# Patient Record
Sex: FEMALE | Race: WHITE | NOT HISPANIC OR LATINO | Employment: FULL TIME | URBAN - METROPOLITAN AREA
[De-identification: names, ages, dates, MRNs, and addresses within clinical notes are randomized per-mention and may not be internally consistent; named-entity substitution may affect disease eponyms.]

---

## 2019-01-15 ENCOUNTER — HOSPITAL ENCOUNTER (EMERGENCY)
Facility: HOSPITAL | Age: 34
Discharge: HOME | End: 2019-01-16
Attending: EMERGENCY MEDICINE
Payer: MEDICAID

## 2019-01-15 ENCOUNTER — NURSE TRIAGE (OUTPATIENT)
Dept: ADMINISTRATIVE | Facility: CLINIC | Age: 34
End: 2019-01-15

## 2019-01-15 DIAGNOSIS — R07.9 CHEST PAIN: ICD-10-CM

## 2019-01-15 LAB
ALBUMIN SERPL BCP-MCNC: 4 G/DL
ALP SERPL-CCNC: 113 U/L
ALT SERPL W/O P-5'-P-CCNC: 39 U/L
ANION GAP SERPL CALC-SCNC: 12 MMOL/L
AST SERPL-CCNC: 39 U/L
B-HCG UR QL: NEGATIVE
BASOPHILS # BLD AUTO: 0.01 K/UL
BASOPHILS NFR BLD: 0.1 %
BILIRUB SERPL-MCNC: 0.6 MG/DL
BUN SERPL-MCNC: 7 MG/DL
CALCIUM SERPL-MCNC: 9.4 MG/DL
CHLORIDE SERPL-SCNC: 104 MMOL/L
CO2 SERPL-SCNC: 23 MMOL/L
CREAT SERPL-MCNC: 0.7 MG/DL
DIFFERENTIAL METHOD: ABNORMAL
EOSINOPHIL # BLD AUTO: 0.2 K/UL
EOSINOPHIL NFR BLD: 2 %
ERYTHROCYTE [DISTWIDTH] IN BLOOD BY AUTOMATED COUNT: 13.3 %
EST. GFR  (AFRICAN AMERICAN): >60 ML/MIN/1.73 M^2
EST. GFR  (NON AFRICAN AMERICAN): >60 ML/MIN/1.73 M^2
GLUCOSE SERPL-MCNC: 94 MG/DL
HCT VFR BLD AUTO: 43 %
HGB BLD-MCNC: 14.6 G/DL
LYMPHOCYTES # BLD AUTO: 3 K/UL
LYMPHOCYTES NFR BLD: 32.1 %
MCH RBC QN AUTO: 31.3 PG
MCHC RBC AUTO-ENTMCNC: 34 G/DL
MCV RBC AUTO: 92 FL
MONOCYTES # BLD AUTO: 0.6 K/UL
MONOCYTES NFR BLD: 6 %
NEUTROPHILS # BLD AUTO: 5.6 K/UL
NEUTROPHILS NFR BLD: 59.8 %
PLATELET # BLD AUTO: 238 K/UL
PMV BLD AUTO: 10.7 FL
POTASSIUM SERPL-SCNC: 3.8 MMOL/L
PROT SERPL-MCNC: 7 G/DL
RBC # BLD AUTO: 4.67 M/UL
SODIUM SERPL-SCNC: 139 MMOL/L
TROPONIN I SERPL DL<=0.01 NG/ML-MCNC: 0.01 NG/ML
WBC # BLD AUTO: 9.29 K/UL

## 2019-01-15 PROCEDURE — 80053 COMPREHEN METABOLIC PANEL: CPT

## 2019-01-15 PROCEDURE — 85025 COMPLETE CBC W/AUTO DIFF WBC: CPT

## 2019-01-15 PROCEDURE — 93010 EKG 12-LEAD: ICD-10-PCS | Mod: ,,, | Performed by: INTERNAL MEDICINE

## 2019-01-15 PROCEDURE — 81025 URINE PREGNANCY TEST: CPT

## 2019-01-15 PROCEDURE — 25000003 PHARM REV CODE 250: Performed by: EMERGENCY MEDICINE

## 2019-01-15 PROCEDURE — 84484 ASSAY OF TROPONIN QUANT: CPT

## 2019-01-15 PROCEDURE — 93010 ELECTROCARDIOGRAM REPORT: CPT | Mod: ,,, | Performed by: INTERNAL MEDICINE

## 2019-01-15 PROCEDURE — 36415 COLL VENOUS BLD VENIPUNCTURE: CPT

## 2019-01-15 PROCEDURE — 99285 EMERGENCY DEPT VISIT HI MDM: CPT | Mod: 25

## 2019-01-15 RX ORDER — IBUPROFEN 400 MG/1
400 TABLET ORAL
Status: COMPLETED | OUTPATIENT
Start: 2019-01-15 | End: 2019-01-15

## 2019-01-15 RX ORDER — PROMETHAZINE HYDROCHLORIDE 25 MG/1
25 TABLET ORAL
Status: COMPLETED | OUTPATIENT
Start: 2019-01-15 | End: 2019-01-15

## 2019-01-15 RX ADMIN — IBUPROFEN 400 MG: 400 TABLET, FILM COATED ORAL at 10:01

## 2019-01-15 RX ADMIN — PROMETHAZINE HYDROCHLORIDE 25 MG: 25 TABLET ORAL at 10:01

## 2019-01-16 VITALS
SYSTOLIC BLOOD PRESSURE: 124 MMHG | OXYGEN SATURATION: 100 % | HEIGHT: 65 IN | DIASTOLIC BLOOD PRESSURE: 68 MMHG | BODY MASS INDEX: 28.62 KG/M2 | HEART RATE: 70 BPM | RESPIRATION RATE: 17 BRPM | TEMPERATURE: 99 F

## 2019-01-16 NOTE — ED PROVIDER NOTES
SCRIBE #1 NOTE: I, Ruba Naranjo, am scribing for, and in the presence of, Cristofer Alexander MD. I have scribed the entire note.         History     Chief Complaint   Patient presents with    Chest Pain     324 mg asa 1 spray nitro       Review of patient's allergies indicates:   Allergen Reactions    Dioxyline phosphate Nausea And Vomiting    Decadron [dexamethasone sodium phosphate]     Doxycycline     Lortab [hydrocodone-acetaminophen]     Morphine      Migraine      Percocet [oxycodone-acetaminophen]     Reglan [metoclopramide] Other (See Comments)     Pt reports seizure activity after taking reglan    Zofran [ondansetron hcl (pf)] Hives         History of Present Illness   HPI    1/15/2019, 9:35 PM  History obtained from the patient      History of Present Illness: Jimena Escalante is a 33 y.o. female patient with PMHx of GERD, anxiety, fibromyalgia, and tachycardia who presents to the Emergency Department for midsternal CP which onset gradually earlier today. Patient states sxs onset 30 minutes after taking Topamax and cough medication. Patient states she has had a cough and congestion for the last 3 weeks; rhinorrhea has resolved. Symptoms are constant and moderate in severity. No mitigating or exacerbating factors reported. Associated sxs include SOB which is improving since onset. Patient received 325mg aspirin and nitro spray en route with minimal relief. Patient denies any fever, chills, diaphoresis, palpitations, extremity weakness/numbness, leg swelling, calf pain, dizziness, N/V, and all other sxs at this time. Patient reports having heart cath at [facility] 3-4 years ago for similar sxs. Patient admits to daily cigarette use. No further complaints or concerns at this time.     Arrival mode:  AASI    PCP: Oh Pat MD        Past Medical History:  Past Medical History:   Diagnosis Date    ADD (attention deficit disorder)     Anxiety     Degenerative disc disease     Depression     DJD  (degenerative joint disease)     Fibromyalgia     Gastroparesis     GERD (gastroesophageal reflux disease)     H/O Clostridium difficile infection     IBS (irritable bowel syndrome)     Meningitis     Migraine headache     Osteoarthritis     Ovarian cyst     Tachycardia     West Nile fever meningitis        Past Surgical History:  Past Surgical History:   Procedure Laterality Date    CARDIAC CATHETERIZATION      EXTRACORPOREAL SHOCK WAVE LITHOTRIPSY      HYSTERECTOMY      KNEE ARTHROSCOPY      TUBAL LIGATION           Family History:  Family History   Problem Relation Age of Onset    Hypertension Mother     Hypertension Father     Heart disease Father     Diabetes Maternal Aunt     Diabetes Maternal Grandmother        Social History:  Social History     Tobacco Use    Smoking status: Current Every Day Smoker     Packs/day: 0.50     Types: Cigarettes    Smokeless tobacco: Never Used   Substance and Sexual Activity    Alcohol use: Yes     Comment: occasionally    Drug use: No    Sexual activity: Yes     Partners: Male        Review of Systems   Review of Systems   Constitutional: Negative for chills, diaphoresis and fever.   HENT: Positive for congestion and rhinorrhea (resolved). Negative for sore throat.    Respiratory: Positive for cough and shortness of breath.    Cardiovascular: Positive for chest pain. Negative for palpitations and leg swelling.   Gastrointestinal: Negative for nausea and vomiting.   Genitourinary: Negative for dysuria.   Musculoskeletal: Negative for back pain.        (-) calf pain   Skin: Negative for rash.   Neurological: Negative for dizziness, weakness and numbness.   Hematological: Does not bruise/bleed easily.   All other systems reviewed and are negative.       Physical Exam     Initial Vitals [01/15/19 2108]   BP Pulse Resp Temp SpO2   135/89 80 16 98.1 °F (36.7 °C) 98 %      MAP       --          Physical Exam  Nursing Notes and Vital Signs  "Reviewed.  Constitutional: Patient is in no acute distress. Well-developed and well-nourished.  Head: Atraumatic. Normocephalic.  Eyes: PERRL. EOM intact. Conjunctivae are not pale. No scleral icterus.  ENT: Mucous membranes are moist. Oropharynx is clear and symmetric.    Neck: Supple. Full ROM. No lymphadenopathy.  Cardiovascular: Regular rate. Regular rhythm. No murmurs, rubs, or gallops. Distal pulses are 2+ and symmetric.  Pulmonary/Chest: No respiratory distress. Clear to auscultation bilaterally. No wheezing or rales.  Abdominal: Soft and non-distended.  There is no tenderness.  No rebound, guarding, or rigidity.   Musculoskeletal: Moves all extremities. No obvious deformities. No edema. No calf tenderness.  Skin: Warm and dry.  Neurological:  Alert, awake, and appropriate.  Normal speech.  No acute focal neurological deficits are appreciated.  Psychiatric: Normal affect. Good eye contact. Appropriate in content.     ED Course   Procedures  ED Vital Signs:  Vitals:    01/15/19 2108   BP: 135/89   Pulse: 80   Resp: 16   Temp: 98.1 °F (36.7 °C)   TempSrc: Oral   SpO2: 98%   Height: 5' 5" (1.651 m)       Abnormal Lab Results:  Labs Reviewed   CBC W/ AUTO DIFFERENTIAL - Abnormal; Notable for the following components:       Result Value    MCH 31.3 (*)     All other components within normal limits   COMPREHENSIVE METABOLIC PANEL   TROPONIN I   PREGNANCY TEST, URINE RAPID        All Lab Results:  Results for orders placed or performed during the hospital encounter of 01/15/19   CBC auto differential   Result Value Ref Range    WBC 9.29 3.90 - 12.70 K/uL    RBC 4.67 4.00 - 5.40 M/uL    Hemoglobin 14.6 12.0 - 16.0 g/dL    Hematocrit 43.0 37.0 - 48.5 %    MCV 92 82 - 98 fL    MCH 31.3 (H) 27.0 - 31.0 pg    MCHC 34.0 32.0 - 36.0 g/dL    RDW 13.3 11.5 - 14.5 %    Platelets 238 150 - 350 K/uL    MPV 10.7 9.2 - 12.9 fL    Gran # (ANC) 5.6 1.8 - 7.7 K/uL    Lymph # 3.0 1.0 - 4.8 K/uL    Mono # 0.6 0.3 - 1.0 K/uL    Eos # " 0.2 0.0 - 0.5 K/uL    Baso # 0.01 0.00 - 0.20 K/uL    Gran% 59.8 38.0 - 73.0 %    Lymph% 32.1 18.0 - 48.0 %    Mono% 6.0 4.0 - 15.0 %    Eosinophil% 2.0 0.0 - 8.0 %    Basophil% 0.1 0.0 - 1.9 %    Differential Method Automated    Comprehensive metabolic panel   Result Value Ref Range    Sodium 139 136 - 145 mmol/L    Potassium 3.8 3.5 - 5.1 mmol/L    Chloride 104 95 - 110 mmol/L    CO2 23 23 - 29 mmol/L    Glucose 94 70 - 110 mg/dL    BUN, Bld 7 6 - 20 mg/dL    Creatinine 0.7 0.5 - 1.4 mg/dL    Calcium 9.4 8.7 - 10.5 mg/dL    Total Protein 7.0 6.0 - 8.4 g/dL    Albumin 4.0 3.5 - 5.2 g/dL    Total Bilirubin 0.6 0.1 - 1.0 mg/dL    Alkaline Phosphatase 113 55 - 135 U/L    AST 39 10 - 40 U/L    ALT 39 10 - 44 U/L    Anion Gap 12 8 - 16 mmol/L    eGFR if African American >60 >60 mL/min/1.73 m^2    eGFR if non African American >60 >60 mL/min/1.73 m^2   Troponin I   Result Value Ref Range    Troponin I 0.008 0.000 - 0.026 ng/mL   Pregnancy, urine rapid (UPT)   Result Value Ref Range    Preg Test, Ur Negative        Imaging Results:  Imaging Results          X-Ray Chest PA And Lateral (Final result)  Result time 01/15/19 22:26:07    Final result by Jason Alfonso MD (01/15/19 22:26:07)                 Impression:      No acute findings.      Electronically signed by: Jason Alfonso MD  Date:    01/15/2019  Time:    22:26             Narrative:    EXAMINATION:  XR CHEST PA AND LATERAL    CLINICAL HISTORY:  Chest pain, unspecified    TECHNIQUE:  PA and lateral views of the chest were performed.    COMPARISON:  11/18/2016    FINDINGS:  The cardiac and mediastinal silhouettes appear within normal limits.   The lungs are clear bilaterally.  No acute osseous findings demonstrated.                                 The EKG was ordered, reviewed, and independently interpreted by the ED provider.  Interpretation time: 2135  Rate: 82 BPM  Rhythm: normal sinus rhythm  Interpretation: Normal axis. Normal intervals. No significant ST  "depression or ST elevation. No STEMI.            The Emergency Provider reviewed the vital signs and test results, which are outlined above.     ED Discussion     12:05 AM: Reassessed pt at this time.  Pt states her condition has improved at this time. Patient states "I feel good, just get me home". Discussed with pt all pertinent ED information and results. Discussed pt dx and plan of tx. Gave pt all f/u and return to the ED instructions. All questions and concerns were addressed at this time. Pt expresses understanding of information and instructions, and is comfortable with plan to discharge. Pt is stable for discharge.    I have discussed with patient and/or family/caretaker chest pain precautions, specifically to return for worsening chest pain, shortness of breath, fever, or any concern.  I have low suspicion for cardiopulmonary, vascular, infectious, respiratory, or other emergent medical condition based on my evaluation in the ED.    I discussed with patient and/or family/caretaker that evaluation in the ED does not suggest any emergent or life threatening medical conditions requiring immediate intervention beyond what was provided in the ED, and I believe patient is safe for discharge.  Regardless, an unremarkable evaluation in the ED does not preclude the development or presence of a serious of life threatening condition. As such, patient was instructed to return immediately for any worsening or change in current symptoms.          ED Medication(s):  Medications   promethazine tablet 25 mg (25 mg Oral Given 1/15/19 2236)   ibuprofen tablet 400 mg (400 mg Oral Given 1/15/19 2236)     Current Discharge Medication List   None       Follow-up Information     Schedule an appointment as soon as possible for a visit  with Oh Pat MD.    Specialty:  Family Medicine  Contact information:  [address]   [address]  [facility] [address] [phone]             [facility] - .    Specialty:  " Emergency Medicine  Why:  As needed, If symptoms worsen  Contact information:  [address]  [place] [facility] [address] [phone]                      Medical Decision Making     Medical Decision Making:   Clinical Tests:   Lab Tests: Ordered and Reviewed  Radiological Study: Ordered and Reviewed  Medical Tests: Ordered and Reviewed  ED Management:  Reviewed patient's cardiac cath at [facility] from December 2015 that was normal.     Additional MDM:   Heart Score:    History:          Slightly suspicious.  ECG:             Normal  Age:               Less than 45 years  Risk factors: 1-2 risk factors  Troponin:       Less than or equal to normal limit  Final Score: 1             Scribe Attestation:   Scribe #1: I performed the above scribed service and the documentation accurately describes the services I performed. I attest to the accuracy of the note.     Attending:   Physician Attestation Statement for Scribe #1: I, Cristofer Alexander MD, personally performed the services described in this documentation, as scribed by Ruba aNranjo, in my presence, and it is both accurate and complete.           Clinical Impression       ICD-10-CM ICD-9-CM   1. Chest pain R07.9 786.50       Disposition:   Disposition: Discharged  Condition: Stable         Cristofer Alexander MD  01/20/19 7806

## 2019-01-16 NOTE — TELEPHONE ENCOUNTER
Reason for Disposition   [1] Chest pain lasts > 5 minutes AND [2] age > 30 AND [3] at least one cardiac risk factor (i.e., hypertension, diabetes, obesity, smoker or strong family history of heart disease)    Protocols used: ST CHEST PAIN-A-AH

## 2019-01-16 NOTE — ED NOTES
Pt resting in ER stretcher, aaox4, rr e/u, NAD noted. Bed low and locked, call light in reach, side rails up x2. Pt provided with specimen cup for urine sample.  Pt verbalized understanding of status and POC; denies further needs. Will continue to monitor.

## 2019-03-22 ENCOUNTER — OFFICE VISIT (OUTPATIENT)
Dept: NEUROLOGY | Facility: CLINIC | Age: 34
End: 2019-03-22
Payer: MEDICAID

## 2019-03-22 VITALS
BODY MASS INDEX: 27.85 KG/M2 | SYSTOLIC BLOOD PRESSURE: 134 MMHG | HEIGHT: 65 IN | HEART RATE: 94 BPM | DIASTOLIC BLOOD PRESSURE: 79 MMHG | WEIGHT: 167.13 LBS

## 2019-03-22 DIAGNOSIS — G43.109 MIGRAINE WITH AURA AND WITHOUT STATUS MIGRAINOSUS, NOT INTRACTABLE: Primary | ICD-10-CM

## 2019-03-22 PROCEDURE — 99204 OFFICE O/P NEW MOD 45 MIN: CPT | Mod: S$PBB,,, | Performed by: PSYCHIATRY & NEUROLOGY

## 2019-03-22 PROCEDURE — 99213 OFFICE O/P EST LOW 20 MIN: CPT | Mod: PBBFAC | Performed by: PSYCHIATRY & NEUROLOGY

## 2019-03-22 PROCEDURE — 99999 PR PBB SHADOW E&M-EST. PATIENT-LVL III: CPT | Mod: PBBFAC,,, | Performed by: PSYCHIATRY & NEUROLOGY

## 2019-03-22 PROCEDURE — 99999 PR PBB SHADOW E&M-EST. PATIENT-LVL III: ICD-10-PCS | Mod: PBBFAC,,, | Performed by: PSYCHIATRY & NEUROLOGY

## 2019-03-22 PROCEDURE — 99204 PR OFFICE/OUTPT VISIT, NEW, LEVL IV, 45-59 MIN: ICD-10-PCS | Mod: S$PBB,,, | Performed by: PSYCHIATRY & NEUROLOGY

## 2019-03-22 RX ORDER — TRAMADOL HYDROCHLORIDE 50 MG/1
50 TABLET ORAL EVERY 6 HOURS
COMMUNITY

## 2019-03-22 RX ORDER — KETOROLAC TROMETHAMINE 15.75 MG/1
SPRAY, METERED NASAL
Qty: 5 EACH | Refills: 0 | Status: SHIPPED | OUTPATIENT
Start: 2019-03-22 | End: 2019-03-22

## 2019-03-22 NOTE — LETTER
March 24, 2019      Filippo Randolph MD  [address]  [address]  [facility] [address]           [facility] [address] [facility] [address]  [place] [address]  Phone: [phone]  Fax: [phone]          Patient: Jimena Escalante   MR Number: [id number]   YOB: 1985   Date of Visit: 3/22/2019       Dear Dr. Filippo Randolph:    Thank you for referring Jimena Escalante to me for evaluation. Attached you will find relevant portions of my assessment and plan of care.    If you have questions, please do not hesitate to call me. I look forward to following Jimena Escalante along with you.    Sincerely,    Wendy Harris,     Enclosure  CC:  No Recipients    If you would like to receive this communication electronically, please contact [email or web address] or [phone] to request more information on [facility] access.    For providers and/or their staff who would like to refer a patient to [facility], please contact us through our one-stop-shop provider referral line, [facility], at [phone].    If you feel you have received this communication in error or would no longer like to receive these types of communications, please e-mail [email or web address]

## 2019-03-22 NOTE — PROGRESS NOTES
Neurology Follow Up Note    Chief Complaint: migraines    HPI:   Jimena Escalante is a 33 y.o. female with multiple comorbidities as outlined below who was referred by her neurologist for further management of migraines. She states she has a severe migraine approximately 7 days a month. She describes her headaches as gradual onset right sided throbbing pain associated with phonophobia, photophobia, nausea and vomiting. At times they are associated with a visual aura. She reports she has tried botox in the past which helped for sometime, but then stopped working. She states she was tried on effexor, nortriptyline, and topamax in the past without relief. She was tried on triptans, but is unable to tolerate them. She states she tried aimovig and this seemed to help significantly, but it caused her to be very constipated. She has no further complaints.     Past Medical History:  Past Medical History:   Diagnosis Date    ADD (attention deficit disorder)     Anxiety     Degenerative disc disease     Depression     DJD (degenerative joint disease)     Fibromyalgia     Gastroparesis     GERD (gastroesophageal reflux disease)     H/O Clostridium difficile infection     IBS (irritable bowel syndrome)     Meningitis     Migraine headache     Osteoarthritis     Ovarian cyst     Tachycardia     West Nile fever meningitis        Past Surgical History:  Past Surgical History:   Procedure Laterality Date    CARDIAC CATHETERIZATION      EXTRACORPOREAL SHOCK WAVE LITHOTRIPSY      HYSTERECTOMY      KNEE ARTHROSCOPY      TUBAL LIGATION         Social History:  Social History     Socioeconomic History    Marital status:      Spouse name: Not on file    Number of children: Not on file    Years of education: Not on file    Highest education level: Not on file   Social Needs    Financial resource strain: Not on file    Food insecurity - worry: Not on file    Food insecurity - inability: Not on file     Transportation needs - medical: Not on file    Transportation needs - non-medical: Not on file   Occupational History    Not on file   Tobacco Use    Smoking status: Current Every Day Smoker     Packs/day: 0.50     Types: Cigarettes    Smokeless tobacco: Never Used   Substance and Sexual Activity    Alcohol use: Yes     Comment: occasionally    Drug use: No    Sexual activity: Yes     Partners: Male   Other Topics Concern    Not on file   Social History Narrative    Not on file       Family History:  Family History   Problem Relation Age of Onset    Hypertension Mother     Hypertension Father     Heart disease Father     Diabetes Maternal Aunt     Diabetes Maternal Grandmother        Medications:  Current Outpatient Medications   Medication Sig Dispense Refill    promethazine (PHENERGAN) 25 MG tablet Take 1 tablet (25 mg total) by mouth every 6 (six) hours as needed for Nausea. 15 tablet 0    traMADol (ULTRAM) 50 mg tablet Take 50 mg by mouth every 6 (six) hours.       No current facility-administered medications for this visit.        Allergies:  Review of patient's allergies indicates:   Allergen Reactions    Dioxyline phosphate Nausea And Vomiting    Decadron [dexamethasone sodium phosphate]     Doxycycline     Lortab [hydrocodone-acetaminophen]     Morphine      Migraine      Percocet [oxycodone-acetaminophen]     Reglan [metoclopramide] Other (See Comments)     Pt reports seizure activity after taking reglan    Zofran [ondansetron hcl (pf)] Hives       ROS:  A 12 point review of system was negative aside from pertinent positives and negatives as outlined above.    Physical Exam  Vitals:    03/22/19 1339   BP: 134/79   Pulse: 94       General: well nourished, well developed  Eyes: no scleral icterus   Nose: nasal turbinates intact  Neck: supple, ROM intact  Skin: no rash or ecchymosis  Joints: no swelling or erythema  Cardiac: regular rate and rhythm  Lungs: clear to auscultation  bilaterally    Neuro:  Mental status: AAO x 3, no dysarthria, no aphasia, communicating appropriately  CN: PERRL, EOMI, VFF, V1-V3 sensation intact, no facial asymmetry, hearing grossly intact, tongue midline  Motor:   RUE 5/5  RLE 5/5  LUE 5/5  LLE 5/5    Normal bulk and tone    Reflexes: 2+ throughout, toes equivocal bilaterally  Sensory: intact to light touch throughout  Coordination: no dysmetria on FTN  Gait: steady    Prior Imaging/Labs:  Reviewed      Assessment and Plan:    33 y.o. female with migraine with aura .    1. Migraine with aura and without status migrainosus, not intractable  - MRI Brain Without Contrast; Future  We discussed a trial of fremanezumab which is similar to aimovig, she was made aware this may cause constipation as well, but she is willing to try to see if this will help with migraines  - fremanezumab-vfrm 225 mg/1.5 mL Syrg; Inject 225 mg into the skin every 28 days.  Dispense: 1.5 mL; Refill: 3  - Sprix spray in 1 nostril every 6 hours as needed for severe migraines, do not exceed 4 times in a day or more than 2 bottles in a week. She was advised not to take ibuprofen on days she is taking sprix as it works in a similar fashion  She was educated on medication overuse headache and was advised not to take OTC medication for headache more than 2-3 times a week    Side effects of medications were explained to patient in detail and she was advised to notify me if she experiences any      Patient was advised to notify me for worsening symptoms. I will see patient back in 6 weeks or sooner if necessary.       Wendy Harris DO  [facility]  [address] [facility] [address]  [place] [address]  [phone]

## 2019-03-25 ENCOUNTER — TELEPHONE (OUTPATIENT)
Dept: PHARMACY | Facility: CLINIC | Age: 34
End: 2019-03-25

## 2019-03-25 RX ORDER — KETOROLAC TROMETHAMINE 15.75 MG/1
SPRAY, METERED NASAL
Qty: 5 EACH | Refills: 0 | Status: SHIPPED | OUTPATIENT
Start: 2019-03-25

## 2019-03-27 NOTE — TELEPHONE ENCOUNTER
DOCUMENTATION ONLY:  Prior Authorization for Ajovy approved from 03/27/19 to 06/27/19    Case Id: [id number]    Co-pay: $3.00    Patient Assistance IS NOT required.    Forwarded to the clinical pharmacist for consult and shipment.    -ARR

## 2019-04-01 ENCOUNTER — TELEPHONE (OUTPATIENT)
Dept: RADIOLOGY | Facility: HOSPITAL | Age: 34
End: 2019-04-01

## 2019-04-01 ENCOUNTER — TELEPHONE (OUTPATIENT)
Dept: NEUROLOGY | Facility: CLINIC | Age: 34
End: 2019-04-01

## 2019-04-01 DIAGNOSIS — G43.809 OTHER MIGRAINE WITHOUT STATUS MIGRAINOSUS, NOT INTRACTABLE: Primary | ICD-10-CM

## 2019-04-01 RX ORDER — LORAZEPAM 1 MG/1
1 TABLET ORAL ONCE AS NEEDED
Qty: 1 TABLET | Refills: 0 | Status: SHIPPED | OUTPATIENT
Start: 2019-04-01 | End: 2019-04-01

## 2019-04-01 NOTE — TELEPHONE ENCOUNTER
Spoke with Aguilar at [facility] fremanezumab-vfrm 225 mg/1.5 mL Syrg was approved.      ----- Message from Mili Tinsley sent at 4/1/2019 10:37 AM CDT -----  Contact: Ethan/ Cover  Meds/  [phone]  Ref# [id number]  Ethan is calling  For a prior authorization for patients medication fremanezumab-vfrm 225 mg/1.5 mL Syrg.  Please call [phone]  Ref# [id number].  Thank you.

## 2019-04-02 ENCOUNTER — HOSPITAL ENCOUNTER (OUTPATIENT)
Dept: RADIOLOGY | Facility: HOSPITAL | Age: 34
Discharge: HOME | End: 2019-04-02
Attending: PSYCHIATRY & NEUROLOGY
Payer: MEDICAID

## 2019-04-02 DIAGNOSIS — G43.109 MIGRAINE WITH AURA AND WITHOUT STATUS MIGRAINOSUS, NOT INTRACTABLE: ICD-10-CM

## 2019-04-02 PROCEDURE — 70551 MRI BRAIN STEM W/O DYE: CPT | Mod: 26,,, | Performed by: RADIOLOGY

## 2019-04-02 PROCEDURE — 70551 MRI BRAIN WITHOUT CONTRAST: ICD-10-PCS | Mod: 26,,, | Performed by: RADIOLOGY

## 2019-04-02 PROCEDURE — 70551 MRI BRAIN STEM W/O DYE: CPT | Mod: TC

## 2019-04-04 ENCOUNTER — TELEPHONE (OUTPATIENT)
Dept: NEUROLOGY | Facility: CLINIC | Age: 34
End: 2019-04-04

## 2019-04-04 NOTE — TELEPHONE ENCOUNTER
Told cover my meds that meds. Was approved.      ----- Message from Sandor Bahena sent at 4/4/2019 12:49 PM CDT -----  Contact: James Grant My Meds/[phone]  Ref#[id number]      James would like to speak to the staff regarding a PA for the medication Ajovy.          Thank you

## 2019-04-05 ENCOUNTER — TELEPHONE (OUTPATIENT)
Dept: PHARMACY | Facility: CLINIC | Age: 34
End: 2019-04-05

## 2019-04-05 NOTE — TELEPHONE ENCOUNTER
Initial Ajovy consult and injection training completed on . Ajovy 225 mg  will be shipped on  to arrive at patient's home on 4/10 via "[employer]Ex. $ 3 copay and permission to charge CC on file.  Address confirmed. Confirmed 2 patient identifiers - name and . Therapy Appropriate.    --Injection experience: Yes    Counseled patient on administration directions:  - Inject 225 mg (1 syringe)  into the skin every 28 days.   - Take out of the refrigerator 30-60 minutes prior to injection.  - Wash hands before and after injection.  - Monthly RX will come with gauze, bandaids, and alcohol swabs.  - Patient may inject in either the tops of the thighs (2 inches above knee and 2 inches below groin), abdomen (at least 2 inches away from belly button), or the outer part of her upper arm with assistance. If injecting 3 syringes, use 3 different injection sites.   - Patient is to wipe down the injection site with the alcohol pad, wait to dry.    - Hold the prefilled syringe with one hand.   - Using the other hand, pull the needle cap straight off. Do not twist the cap.  - Throw the needle cap away immediately.   - Gently pinch up at least 1 inch of the cleaned skin. Insert the needle into the pinched skin at a 45 to 90 degree angle. When the needle is all the way into the skin, slowly press down on the plunger until it is as far as it will go.  - After all of the medication has been injected, pull the needle straight out.  - Do not recap the needle when the injection is complete.   - Patient should rotate injection sites.   - Patient will use sharps container; once full, per [place] law, she/ he may lock the sharps container and place in trash. Pharmacy will replace the sharps at no additional charge.    Patient was counseled on possible side effects:  - Injection site reaction including: redness, soreness, itching, bruising, which should resolve within 3-5 days.  - Allergic reactions including: itching, rash, hives, that can occur  within hours and up to 1 month after taking Ajovy  - Signs and symptoms of a severe allergic reaction: swelling of face, mouth, tongue or throat, and trouble breathing. Informed patient to get medical help immediately if these occur.       Advised to keep a calendar to stay compliant. Consultation included: indication; goals of treatment; administration; storage and handling; side effects; how to handle side effects; the importance of compliance; the importance of keeping all follow up appointments.  Patient understands to report any medication changes to [facility] and provider. All questions answered and addressed to patients satisfaction. [facility] to contact patient in 3 weeks for refills.

## 2019-05-01 ENCOUNTER — TELEPHONE (OUTPATIENT)
Dept: PHARMACY | Facility: CLINIC | Age: 34
End: 2019-05-01

## 2019-05-07 ENCOUNTER — TELEPHONE (OUTPATIENT)
Dept: NEUROLOGY | Facility: CLINIC | Age: 34
End: 2019-05-07

## 2019-05-07 NOTE — TELEPHONE ENCOUNTER
Spoke with patient she states that medication is doing well and would like to cancel her appointment because she lives two hours away. If she feels like she needs to come in she will set up another appointment. Also I gave her the phone number to [employer] to see the status of the Ketorolac spray.    ----- Message from Federica Shrestha sent at 5/7/2019  9:57 AM CDT -----  Contact: pt  Type: Patient Call Back    Who called:pt    What is the request in detail:pt wants to speak to nurse in regards to medications. Call pt    Can the clinic reply by [facility]?    Would the patient rather a call back or a response via [facility]? Call back    Best call back number:[phone]    Additional Information:

## 2019-05-29 ENCOUNTER — TELEPHONE (OUTPATIENT)
Dept: PHARMACY | Facility: CLINIC | Age: 34
End: 2019-05-29

## 2019-08-01 ENCOUNTER — TELEPHONE (OUTPATIENT)
Dept: NEUROLOGY | Facility: CLINIC | Age: 34
End: 2019-08-01

## 2019-08-01 NOTE — TELEPHONE ENCOUNTER
[facility] called to notify us that the patient's Ajovy was approved for 1 year until 7/31/2020

## 2019-11-08 DIAGNOSIS — G43.109 MIGRAINE WITH AURA AND WITHOUT STATUS MIGRAINOSUS, NOT INTRACTABLE: ICD-10-CM

## 2019-11-08 RX ORDER — FREMANEZUMAB-VFRM 225 MG/1.5ML
INJECTION SUBCUTANEOUS
Qty: 1.5 ML | Refills: 0 | Status: SHIPPED | OUTPATIENT
Start: 2019-11-08

## 2019-11-08 NOTE — TELEPHONE ENCOUNTER
Spoke with patient about a closer neurologist she said she will make one with her old one so she can cont to get her refills of Ajovy.

## 2021-07-07 ENCOUNTER — HOSPITAL ENCOUNTER (EMERGENCY)
Facility: HOSPITAL | Age: 36
Discharge: HOME | End: 2021-07-07
Attending: EMERGENCY MEDICINE
Payer: COMMERCIAL

## 2021-07-07 VITALS
OXYGEN SATURATION: 100 % | DIASTOLIC BLOOD PRESSURE: 67 MMHG | WEIGHT: 155 LBS | RESPIRATION RATE: 16 BRPM | HEART RATE: 78 BPM | BODY MASS INDEX: 25.79 KG/M2 | SYSTOLIC BLOOD PRESSURE: 92 MMHG | TEMPERATURE: 98 F

## 2021-07-07 DIAGNOSIS — G43.809 OTHER MIGRAINE WITHOUT STATUS MIGRAINOSUS, NOT INTRACTABLE: Primary | ICD-10-CM

## 2021-07-07 PROCEDURE — 99285 EMERGENCY DEPT VISIT HI MDM: CPT | Mod: 25

## 2021-07-07 PROCEDURE — 96375 TX/PRO/DX INJ NEW DRUG ADDON: CPT

## 2021-07-07 PROCEDURE — 96361 HYDRATE IV INFUSION ADD-ON: CPT

## 2021-07-07 PROCEDURE — 25000003 PHARM REV CODE 250: Performed by: NURSE PRACTITIONER

## 2021-07-07 PROCEDURE — 96374 THER/PROPH/DIAG INJ IV PUSH: CPT

## 2021-07-07 PROCEDURE — 63600175 PHARM REV CODE 636 W HCPCS: Performed by: NURSE PRACTITIONER

## 2021-07-07 RX ORDER — FENTANYL CITRATE 50 UG/ML
25 INJECTION, SOLUTION INTRAMUSCULAR; INTRAVENOUS
Status: COMPLETED | OUTPATIENT
Start: 2021-07-07 | End: 2021-07-07

## 2021-07-07 RX ORDER — KETOROLAC TROMETHAMINE 30 MG/ML
30 INJECTION, SOLUTION INTRAMUSCULAR; INTRAVENOUS
Status: COMPLETED | OUTPATIENT
Start: 2021-07-07 | End: 2021-07-07

## 2021-07-07 RX ORDER — PROCHLORPERAZINE EDISYLATE 5 MG/ML
10 INJECTION INTRAMUSCULAR; INTRAVENOUS
Status: COMPLETED | OUTPATIENT
Start: 2021-07-07 | End: 2021-07-07

## 2021-07-07 RX ORDER — DIPHENHYDRAMINE HYDROCHLORIDE 50 MG/ML
12.5 INJECTION INTRAMUSCULAR; INTRAVENOUS
Status: COMPLETED | OUTPATIENT
Start: 2021-07-07 | End: 2021-07-07

## 2021-07-07 RX ADMIN — FENTANYL CITRATE 25 MCG: 50 INJECTION INTRAMUSCULAR; INTRAVENOUS at 01:07

## 2021-07-07 RX ADMIN — PROCHLORPERAZINE EDISYLATE 10 MG: 5 INJECTION INTRAMUSCULAR; INTRAVENOUS at 11:07

## 2021-07-07 RX ADMIN — SODIUM CHLORIDE 250 ML: 0.9 INJECTION, SOLUTION INTRAVENOUS at 12:07

## 2021-07-07 RX ADMIN — DIPHENHYDRAMINE HYDROCHLORIDE 12.5 MG: 50 INJECTION INTRAMUSCULAR; INTRAVENOUS at 11:07

## 2021-07-07 RX ADMIN — KETOROLAC TROMETHAMINE 30 MG: 30 INJECTION, SOLUTION INTRAMUSCULAR; INTRAVENOUS at 11:07
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
## 2023-01-03 ENCOUNTER — HOSPITAL ENCOUNTER (EMERGENCY)
Facility: HOSPITAL | Age: 38
Discharge: HOME | End: 2023-01-03
Attending: EMERGENCY MEDICINE
Payer: COMMERCIAL

## 2023-01-03 VITALS
BODY MASS INDEX: 24.49 KG/M2 | OXYGEN SATURATION: 100 % | DIASTOLIC BLOOD PRESSURE: 75 MMHG | RESPIRATION RATE: 22 BRPM | SYSTOLIC BLOOD PRESSURE: 125 MMHG | WEIGHT: 147.19 LBS | HEART RATE: 96 BPM | TEMPERATURE: 98 F

## 2023-01-03 DIAGNOSIS — N39.0 URINARY TRACT INFECTION WITHOUT HEMATURIA, SITE UNSPECIFIED: ICD-10-CM

## 2023-01-03 DIAGNOSIS — R10.12 ABDOMINAL CRAMPING IN LEFT UPPER QUADRANT: ICD-10-CM

## 2023-01-03 DIAGNOSIS — R11.2 NAUSEA VOMITING AND DIARRHEA: Primary | ICD-10-CM

## 2023-01-03 DIAGNOSIS — R19.7 NAUSEA VOMITING AND DIARRHEA: Primary | ICD-10-CM

## 2023-01-03 LAB
ALBUMIN SERPL BCP-MCNC: 4 G/DL (ref 3.5–5.2)
ALP SERPL-CCNC: 76 U/L (ref 55–135)
ALT SERPL W/O P-5'-P-CCNC: 189 U/L (ref 10–44)
ANION GAP SERPL CALC-SCNC: 12 MMOL/L (ref 8–16)
AST SERPL-CCNC: 80 U/L (ref 10–40)
B-HCG UR QL: NEGATIVE
BACTERIA #/AREA URNS HPF: ABNORMAL /HPF
BASOPHILS # BLD AUTO: 0.01 K/UL (ref 0–0.2)
BASOPHILS NFR BLD: 0.2 % (ref 0–1.9)
BILIRUB SERPL-MCNC: 0.5 MG/DL (ref 0.1–1)
BILIRUB UR QL STRIP: NEGATIVE
BUN SERPL-MCNC: 8 MG/DL (ref 6–20)
C DIFF GDH STL QL: NEGATIVE
C DIFF TOX A+B STL QL IA: NEGATIVE
CALCIUM SERPL-MCNC: 9.5 MG/DL (ref 8.7–10.5)
CHLORIDE SERPL-SCNC: 100 MMOL/L (ref 95–110)
CLARITY UR: ABNORMAL
CO2 SERPL-SCNC: 27 MMOL/L (ref 23–29)
COLOR UR: YELLOW
CREAT SERPL-MCNC: 0.7 MG/DL (ref 0.5–1.4)
DIFFERENTIAL METHOD: ABNORMAL
EOSINOPHIL # BLD AUTO: 0 K/UL (ref 0–0.5)
EOSINOPHIL NFR BLD: 0.9 % (ref 0–8)
ERYTHROCYTE [DISTWIDTH] IN BLOOD BY AUTOMATED COUNT: 12.1 % (ref 11.5–14.5)
EST. GFR  (NO RACE VARIABLE): >60 ML/MIN/1.73 M^2
GLUCOSE SERPL-MCNC: 99 MG/DL (ref 70–110)
GLUCOSE UR QL STRIP: NEGATIVE
HCT VFR BLD AUTO: 36 % (ref 37–48.5)
HCV AB SERPL QL IA: NEGATIVE
HEP C VIRUS HOLD SPECIMEN: NORMAL
HGB BLD-MCNC: 12 G/DL (ref 12–16)
HGB UR QL STRIP: NEGATIVE
HIV 1+2 AB+HIV1 P24 AG SERPL QL IA: NEGATIVE
HYALINE CASTS #/AREA URNS LPF: 1 /LPF
IMM GRANULOCYTES # BLD AUTO: 0.01 K/UL (ref 0–0.04)
IMM GRANULOCYTES NFR BLD AUTO: 0.2 % (ref 0–0.5)
KETONES UR QL STRIP: NEGATIVE
LEUKOCYTE ESTERASE UR QL STRIP: ABNORMAL
LYMPHOCYTES # BLD AUTO: 1.7 K/UL (ref 1–4.8)
LYMPHOCYTES NFR BLD: 36 % (ref 18–48)
MCH RBC QN AUTO: 30.5 PG (ref 27–31)
MCHC RBC AUTO-ENTMCNC: 33.3 G/DL (ref 32–36)
MCV RBC AUTO: 92 FL (ref 82–98)
MICROSCOPIC COMMENT: ABNORMAL
MONOCYTES # BLD AUTO: 0.3 K/UL (ref 0.3–1)
MONOCYTES NFR BLD: 6.2 % (ref 4–15)
NEUTROPHILS # BLD AUTO: 2.7 K/UL (ref 1.8–7.7)
NEUTROPHILS NFR BLD: 56.5 % (ref 38–73)
NITRITE UR QL STRIP: NEGATIVE
NRBC BLD-RTO: 0 /100 WBC
OB PNL STL: NEGATIVE
PH UR STRIP: 7 [PH] (ref 5–8)
PLATELET # BLD AUTO: 216 K/UL (ref 150–450)
PMV BLD AUTO: 9.6 FL (ref 9.2–12.9)
POTASSIUM SERPL-SCNC: 3.6 MMOL/L (ref 3.5–5.1)
PROT SERPL-MCNC: 6.6 G/DL (ref 6–8.4)
PROT UR QL STRIP: NEGATIVE
RBC # BLD AUTO: 3.93 M/UL (ref 4–5.4)
RBC #/AREA URNS HPF: 3 /HPF (ref 0–4)
RV AG STL QL IA.RAPID: NEGATIVE
SODIUM SERPL-SCNC: 139 MMOL/L (ref 136–145)
SP GR UR STRIP: 1.01 (ref 1–1.03)
SQUAMOUS #/AREA URNS HPF: 25 /HPF
URN SPEC COLLECT METH UR: ABNORMAL
UROBILINOGEN UR STRIP-ACNC: NEGATIVE EU/DL
WBC # BLD AUTO: 4.69 K/UL (ref 3.9–12.7)
WBC #/AREA URNS HPF: 18 /HPF (ref 0–5)
WBC CLUMPS URNS QL MICRO: ABNORMAL
YEAST URNS QL MICRO: ABNORMAL

## 2023-01-03 PROCEDURE — 99285 EMERGENCY DEPT VISIT HI MDM: CPT | Mod: 25

## 2023-01-03 PROCEDURE — 85025 COMPLETE CBC W/AUTO DIFF WBC: CPT | Performed by: EMERGENCY MEDICINE

## 2023-01-03 PROCEDURE — 87329 GIARDIA AG IA: CPT | Performed by: EMERGENCY MEDICINE

## 2023-01-03 PROCEDURE — 87046 STOOL CULTR AEROBIC BACT EA: CPT | Performed by: EMERGENCY MEDICINE

## 2023-01-03 PROCEDURE — 81000 URINALYSIS NONAUTO W/SCOPE: CPT | Performed by: EMERGENCY MEDICINE

## 2023-01-03 PROCEDURE — 89055 LEUKOCYTE ASSESSMENT FECAL: CPT | Performed by: EMERGENCY MEDICINE

## 2023-01-03 PROCEDURE — 87209 SMEAR COMPLEX STAIN: CPT | Performed by: EMERGENCY MEDICINE

## 2023-01-03 PROCEDURE — 96374 THER/PROPH/DIAG INJ IV PUSH: CPT

## 2023-01-03 PROCEDURE — 87427 SHIGA-LIKE TOXIN AG IA: CPT | Performed by: EMERGENCY MEDICINE

## 2023-01-03 PROCEDURE — 87045 FECES CULTURE AEROBIC BACT: CPT | Performed by: EMERGENCY MEDICINE

## 2023-01-03 PROCEDURE — 86803 HEPATITIS C AB TEST: CPT | Performed by: EMERGENCY MEDICINE

## 2023-01-03 PROCEDURE — 87425 ROTAVIRUS AG IA: CPT | Performed by: EMERGENCY MEDICINE

## 2023-01-03 PROCEDURE — 82653 EL-1 FECAL QUANTITATIVE: CPT | Performed by: EMERGENCY MEDICINE

## 2023-01-03 PROCEDURE — 80053 COMPREHEN METABOLIC PANEL: CPT | Performed by: EMERGENCY MEDICINE

## 2023-01-03 PROCEDURE — 81025 URINE PREGNANCY TEST: CPT | Performed by: EMERGENCY MEDICINE

## 2023-01-03 PROCEDURE — 82272 OCCULT BLD FECES 1-3 TESTS: CPT | Performed by: EMERGENCY MEDICINE

## 2023-01-03 PROCEDURE — 82705 FATS/LIPIDS FECES QUAL: CPT | Performed by: EMERGENCY MEDICINE

## 2023-01-03 PROCEDURE — 96361 HYDRATE IV INFUSION ADD-ON: CPT

## 2023-01-03 PROCEDURE — 87389 HIV-1 AG W/HIV-1&-2 AB AG IA: CPT | Performed by: EMERGENCY MEDICINE

## 2023-01-03 PROCEDURE — 63600175 PHARM REV CODE 636 W HCPCS: Performed by: EMERGENCY MEDICINE

## 2023-01-03 PROCEDURE — 87324 CLOSTRIDIUM AG IA: CPT | Performed by: EMERGENCY MEDICINE

## 2023-01-03 PROCEDURE — 87086 URINE CULTURE/COLONY COUNT: CPT | Performed by: EMERGENCY MEDICINE

## 2023-01-03 RX ORDER — KETOROLAC TROMETHAMINE 30 MG/ML
15 INJECTION, SOLUTION INTRAMUSCULAR; INTRAVENOUS
Status: COMPLETED | OUTPATIENT
Start: 2023-01-03 | End: 2023-01-03

## 2023-01-03 RX ORDER — NITROFURANTOIN 25; 75 MG/1; MG/1
100 CAPSULE ORAL 2 TIMES DAILY
Qty: 10 CAPSULE | Refills: 0 | Status: SHIPPED | OUTPATIENT
Start: 2023-01-03 | End: 2023-01-08

## 2023-01-03 RX ADMIN — KETOROLAC TROMETHAMINE 15 MG: 30 INJECTION, SOLUTION INTRAMUSCULAR; INTRAVENOUS at 01:01

## 2023-01-03 RX ADMIN — SODIUM CHLORIDE, POTASSIUM CHLORIDE, SODIUM LACTATE AND CALCIUM CHLORIDE 1000 ML: 600; 310; 30; 20 INJECTION, SOLUTION INTRAVENOUS at 11:01

## 2023-01-03 NOTE — ED PROVIDER NOTES
SCRIBE #1 NOTE: I, Usha Clinton/Dawn Cardenas, am scribing for, and in the presence of, Haim Millard MD. I have scribed the entire note.       History     Chief Complaint   Patient presents with    Abdominal Pain     LLQ sharp pain with distention . Has been having bowel movements and passing gas. Does not have a gallbladder anymore. At home bentyl not working      Review of patient's allergies indicates:   Allergen Reactions    Dioxyline phosphate Nausea And Vomiting    Decadron [dexamethasone sodium phosphate]     Doxycycline     Morphine      Migraine      Zofran [ondansetron hcl (pf)] Hives         History of Present Illness     HPI    1/3/2023, 11:24 AM  History obtained from the patient      History of Present Illness: Jimena Escalante is a 37 y.o. female patient with a PMHx of GERD, IBS, Clostridium difficile infection, West Nile fever meningitis, ovarian cyst, and osteoarthritis who presents to the Emergency Department for evaluation of LUQ abdominal pain which onset yesterday. Pt describes the pain as a cramping sensation. She states that she has a history of IBS and gastroparesis, but her current pain is different than her normal pain with IBS and gastroparesis. Symptoms are constant and moderate in severity. No mitigating or exacerbating factors reported. Associated sxs include nausea, vomiting, LUQ abdominal distention, diarrhea (chronic), and loss of appetite. Pt denies any fever, chills, dysuria, vaginal discharge, vaginal bleeding, vaginal pain, pelvic pain, SOB, CP and all other sxs at this time. Prior Tx includes Linzess. Pt is allergic to Zofran. No further complaints or concerns at this time.       Arrival mode: Personal vehicle    PCP: Mathew Keys MD        Past Medical History:  Past Medical History:   Diagnosis Date    ADD (attention deficit disorder)     Anxiety     Degenerative disc disease     Depression     DJD (degenerative joint disease)     Fibromyalgia     Gastroparesis      GERD (gastroesophageal reflux disease)     H/O Clostridium difficile infection     History of infection of intestine due to Clostridium difficile     approximately 2018    IBS (irritable bowel syndrome)     Meningitis     Migraine headache     Osteoarthritis     Ovarian cyst     Tachycardia     West Nile fever meningitis        Past Surgical History:  Past Surgical History:   Procedure Laterality Date    CARDIAC CATHETERIZATION      EXTRACORPOREAL SHOCK WAVE LITHOTRIPSY      HYSTERECTOMY      KNEE ARTHROSCOPY      TUBAL LIGATION           Family History:  Family History   Problem Relation Age of Onset    Hypertension Mother     Hypertension Father     Heart disease Father     Diabetes Maternal Aunt     Diabetes Maternal Grandmother        Social History:  Social History     Tobacco Use    Smoking status: Every Day     Packs/day: 0.50     Types: Cigarettes    Smokeless tobacco: Never   Substance and Sexual Activity    Alcohol use: Yes     Comment: occasionally    Drug use: No    Sexual activity: Yes     Partners: Male        Review of Systems     Review of Systems   Constitutional:  Positive for appetite change (loss). Negative for chills and fever.   HENT:  Negative for sore throat.    Respiratory:  Negative for shortness of breath.    Cardiovascular:  Negative for chest pain.   Gastrointestinal:  Positive for abdominal distention (LUQ), abdominal pain (LUQ), diarrhea (chronic), nausea and vomiting.   Genitourinary:  Negative for dysuria, pelvic pain, vaginal bleeding, vaginal discharge and vaginal pain.   Musculoskeletal:  Negative for back pain.   Skin:  Negative for rash.   Neurological:  Negative for weakness.   Hematological:  Does not bruise/bleed easily.   All other systems reviewed and are negative.     Physical Exam     Initial Vitals [01/03/23 1010]   BP Pulse Resp Temp SpO2   125/75 104 18 98.3 °F (36.8 °C) 100 %      MAP       --          Physical Exam  Nursing Notes and Vital Signs  Reviewed.  Constitutional: Patient is in no acute distress. Well-developed and well-nourished.  Head: Atraumatic. Normocephalic.  Eyes: PERRL. EOM intact. Conjunctivae are not pale. No scleral icterus.  ENT: Mucous membranes are moist. Oropharynx is clear and symmetric.    Neck: Supple. Full ROM. No lymphadenopathy.  Cardiovascular: Regular rate. Regular rhythm. No murmurs, rubs, or gallops. Distal pulses are 2+ and symmetric.  Pulmonary/Chest: No respiratory distress. Clear to auscultation bilaterally. No wheezing or rales.  Abdominal: Soft. Non-distended. LUQ tenderness. Good bowel sounds.  Genitourinary: No CVA tenderness  Musculoskeletal: Moves all extremities. No obvious deformities. No edema. No calf tenderness.  Skin: Warm and dry.  Neurological:  Alert, awake, and appropriate.  Normal speech.  No acute focal neurological deficits are appreciated.  Psychiatric: Normal affect. Good eye contact. Appropriate in content.     ED Course   Procedures  ED Vital Signs:  Vitals:    01/03/23 1010   BP: 125/75   Pulse: 104   Resp: 18   Temp: 98.3 °F (36.8 °C)   TempSrc: Oral   SpO2: 100%   Weight: 66.7 kg (147 lb 2.5 oz)       Abnormal Lab Results:  Labs Reviewed   CBC W/ AUTO DIFFERENTIAL - Abnormal; Notable for the following components:       Result Value    RBC 3.93 (*)     Hematocrit 36.0 (*)     All other components within normal limits   COMPREHENSIVE METABOLIC PANEL - Abnormal; Notable for the following components:    AST 80 (*)      (*)     All other components within normal limits   URINALYSIS, REFLEX TO URINE CULTURE - Abnormal; Notable for the following components:    Appearance, UA Hazy (*)     Leukocytes, UA 3+ (*)     All other components within normal limits    Narrative:     Specimen Source->Urine   URINALYSIS MICROSCOPIC - Abnormal; Notable for the following components:    WBC, UA 18 (*)     WBC Clumps, UA Moderate (*)     Yeast, UA Few (*)     All other components within normal limits     Narrative:     Specimen Source->Urine   CLOSTRIDIUM DIFFICILE   CULTURE, STOOL   CULTURE, URINE   PREGNANCY TEST, URINE RAPID    Narrative:     Specimen Source->Urine   HIV 1 / 2 ANTIBODY    Narrative:     Release to patient->Immediate   HEPATITIS C ANTIBODY    Narrative:     Release to patient->Immediate   HEP C VIRUS HOLD SPECIMEN    Narrative:     Release to patient->Immediate   PANCREATIC ELASTASE, FECAL   FECAL FAT, QUALITATIVE   OCCULT BLOOD X 1, STOOL   WBC, STOOL   ROTAVIRUS ANTIGEN, STOOL   GIARDIA/CRYPTOSPORIDIUM (EIA)   STOOL EXAM-OVA,CYSTS,PARASITES        All Lab Results:  Results for orders placed or performed during the hospital encounter of 01/03/23   CBC auto differential   Result Value Ref Range    WBC 4.69 3.90 - 12.70 K/uL    RBC 3.93 (L) 4.00 - 5.40 M/uL    Hemoglobin 12.0 12.0 - 16.0 g/dL    Hematocrit 36.0 (L) 37.0 - 48.5 %    MCV 92 82 - 98 fL    MCH 30.5 27.0 - 31.0 pg    MCHC 33.3 32.0 - 36.0 g/dL    RDW 12.1 11.5 - 14.5 %    Platelets 216 150 - 450 K/uL    MPV 9.6 9.2 - 12.9 fL    Immature Granulocytes 0.2 0.0 - 0.5 %    Gran # (ANC) 2.7 1.8 - 7.7 K/uL    Immature Grans (Abs) 0.01 0.00 - 0.04 K/uL    Lymph # 1.7 1.0 - 4.8 K/uL    Mono # 0.3 0.3 - 1.0 K/uL    Eos # 0.0 0.0 - 0.5 K/uL    Baso # 0.01 0.00 - 0.20 K/uL    nRBC 0 0 /100 WBC    Gran % 56.5 38.0 - 73.0 %    Lymph % 36.0 18.0 - 48.0 %    Mono % 6.2 4.0 - 15.0 %    Eosinophil % 0.9 0.0 - 8.0 %    Basophil % 0.2 0.0 - 1.9 %    Differential Method Automated    Comprehensive metabolic panel   Result Value Ref Range    Sodium 139 136 - 145 mmol/L    Potassium 3.6 3.5 - 5.1 mmol/L    Chloride 100 95 - 110 mmol/L    CO2 27 23 - 29 mmol/L    Glucose 99 70 - 110 mg/dL    BUN 8 6 - 20 mg/dL    Creatinine 0.7 0.5 - 1.4 mg/dL    Calcium 9.5 8.7 - 10.5 mg/dL    Total Protein 6.6 6.0 - 8.4 g/dL    Albumin 4.0 3.5 - 5.2 g/dL    Total Bilirubin 0.5 0.1 - 1.0 mg/dL    Alkaline Phosphatase 76 55 - 135 U/L    AST 80 (H) 10 - 40 U/L     (H) 10  - 44 U/L    Anion Gap 12 8 - 16 mmol/L    eGFR >60 >60 mL/min/1.73 m^2   Urinalysis, Reflex to Urine Culture Urine, Clean Catch    Specimen: Urine   Result Value Ref Range    Specimen UA Urine, Clean Catch     Color, UA Yellow Yellow, Straw, Kelsie    Appearance, UA Hazy (A) Clear    pH, UA 7.0 5.0 - 8.0    Specific Gravity, UA 1.010 1.005 - 1.030    Protein, UA Negative Negative    Glucose, UA Negative Negative    Ketones, UA Negative Negative    Bilirubin (UA) Negative Negative    Occult Blood UA Negative Negative    Nitrite, UA Negative Negative    Urobilinogen, UA Negative <2.0 EU/dL    Leukocytes, UA 3+ (A) Negative   Pregnancy, urine rapid   Result Value Ref Range    Preg Test, Ur Negative    HIV 1/2 Ag/Ab (4th Gen)   Result Value Ref Range    HIV 1/2 Ag/Ab Negative Negative   Hepatitis C Antibody   Result Value Ref Range    Hepatitis C Ab Negative Negative   HCV Virus Hold Specimen   Result Value Ref Range    HEP C Virus Hold Specimen Hold for HCV sendout    Urinalysis Microscopic   Result Value Ref Range    RBC, UA 3 0 - 4 /hpf    WBC, UA 18 (H) 0 - 5 /hpf    WBC Clumps, UA Moderate (A) None-Rare    Bacteria Occasional None-Occ /hpf    Yeast, UA Few (A) None    Squam Epithel, UA 25 /hpf    Hyaline Casts, UA 1 0-1/lpf /lpf    Microscopic Comment SEE COMMENT          Imaging Results:  Imaging Results              CT Abdomen Pelvis  Without Contrast (Final result)  Result time 01/03/23 10:57:46      Final result by PETE Leon Sr., MD (01/03/23 10:57:46)                   Impression:      1. The appendix and the rest of the gastrointestinal system are normal in appearance.  2. There is moderate generalized atrophy of the pancreas.  3. There are calcific densities in the fat posterior to both sides of the pelvis.  This is characteristic of prior injections.  All CT scans at this facility use dose modulation, iterative reconstruction, and/or weight base dosing when appropriate to reduce radiation dose when  appropriate to reduce radiation dose to as low as reasonably achievable.      Electronically signed by: Vadim Leon MD  Date:    01/03/2023  Time:    10:57               Narrative:    EXAMINATION:  CT ABDOMEN PELVIS WITHOUT CONTRAST    CLINICAL HISTORY:  LLQ abdominal pain;    TECHNIQUE:  Standard abdomen and pelvis CT protocol without oral or IV contrast was performed.    COMPARISON:  09/26/2015    FINDINGS:  Finding: The size of the heart is within normal limits.  There is no evidence of an acute pulmonary process.  There is no pneumothorax or pleural effusion.    The gallbladder is absent.  There are surgical clips in the gallbladder fossa.  There is moderate generalized atrophy of the pancreas.  The liver, spleen, adrenals, and kidneys are normal in appearance. The ureters and the urinary bladder are normal in appearance.  The uterus is absent.  The appendix and the rest of the gastrointestinal system are normal in appearance. There is no free fluid within the abdomen or pelvis. There is no pneumoperitoneum.  There are calcific densities in the fat posterior to both sides of the pelvis.                                            The Emergency Provider reviewed the vital signs and test results, which are outlined above.     ED Discussion     12:57 PM: Reassessed pt at this time. Discussed with pt all pertinent ED information and results. Discussed pt dx and plan of tx. Gave pt all f/u and return to the ED instructions. All questions and concerns were addressed at this time. Pt expresses understanding of information and instructions, and is comfortable with plan to discharge. Pt is stable for discharge.    I discussed with patient and/or family/caretaker that evaluation in the ED does not suggest any emergent or life threatening medical conditions requiring immediate intervention beyond what was provided in the ED, and I believe patient is safe for discharge.  Regardless, an unremarkable evaluation in the ED  does not preclude the development or presence of a serious of life threatening condition. As such, patient was instructed to return immediately for any worsening or change in current symptoms.        ED Course as of 01/03/23 1301   Tue Jan 03, 2023   1256 Considered anti-diarrheal medications, but patient has a history of C. Diff, will avoid. Patient is allergic to Zofran, and does not want phenergan due to needing to drive. She is okay with not getting nausea medication at this time.  [BA]      ED Course User Index  [BA] Haim Millard MD     Medical Decision Making:   Clinical Tests:   Lab Tests: Ordered and Reviewed  Radiological Study: Ordered and Reviewed  Medical Tests: Ordered and Reviewed         ED Medication(s):  Medications   lactated ringers bolus 1,000 mL (1,000 mLs Intravenous New Bag 1/3/23 1159)       New Prescriptions    NITROFURANTOIN, MACROCRYSTAL-MONOHYDRATE, (MACROBID) 100 MG CAPSULE    Take 1 capsule (100 mg total) by mouth 2 (two) times daily. for 5 days        Medication List        START taking these medications      nitrofurantoin (macrocrystal-monohydrate) 100 MG capsule  Commonly known as: MACROBID  Take 1 capsule (100 mg total) by mouth 2 (two) times daily. for 5 days            ASK your doctor about these medications      AJOVY SYRINGE 225 mg/1.5 mL injection  Generic drug: fremanezumab-vfrm  Inject 225 mg into the skin every 28 days.     ketorolac 15.75 mg/spray Spry  1 spray in 1 nostril every 6 hours as needed for severe migraine, do not exceed more than 4 times in one day or more than 2 times a week     LORazepam 1 MG tablet  Commonly known as: ATIVAN  Take 1 tablet (1 mg total) by mouth once as needed (Take 1 tab 20-30 min prior to MRI).     promethazine 25 MG tablet  Commonly known as: PHENERGAN  Take 1 tablet (25 mg total) by mouth every 6 (six) hours as needed for Nausea.     traMADoL 50 mg tablet  Commonly known as: ULTRAM               Where to Get Your Medications         These medications were sent to [facility] - [place], [place] - [address]   [address] , [place] [address]      Phone: [phone]   nitrofurantoin (macrocrystal-monohydrate) 100 MG capsule            Follow-up Information       Your Gastroenterologist. Schedule an appointment as soon as possible for a visit in 2 days.    Why: For re-evaluation and further treatment             [facility] - Emergency Dept.. Go today.    Specialty: Emergency Medicine  Why: If symptoms worsen, For re-evaluation and further treatment, As needed  Contact information:  [address] [facility] [address] [phone]                               Scribe Attestation:   Scribe #1: I performed the above scribed service and the documentation accurately describes the services I performed. I attest to the accuracy of the note.     Attending:   Physician Attestation Statement for Scribe #1: I, Haim Millard MD, personally performed the services described in this documentation, as scribed by Usha Clinton/Dawn Cardenas, in my presence, and it is both accurate and complete.           Clinical Impression       ICD-10-CM ICD-9-CM   1. Nausea vomiting and diarrhea  R11.2 787.91    R19.7 787.01   2. Abdominal cramping in left upper quadrant  R10.12 789.02   3. Urinary tract infection without hematuria, site unspecified  N39.0 599.0       Disposition:   Disposition: Discharged  Condition: Stable       Haim Millard MD  01/03/23 2800

## 2023-01-04 LAB
CRYPTOSP AG STL QL IA: NEGATIVE
G LAMBLIA AG STL QL IA: NEGATIVE
WBC #/AREA STL HPF: NORMAL /[HPF]

## 2023-01-05 LAB
BACTERIA UR CULT: NORMAL
E COLI SXT1 STL QL IA: NEGATIVE
E COLI SXT2 STL QL IA: NEGATIVE
O+P STL MICRO: NORMAL

## 2023-01-06 LAB
ELASTASE 1, FECAL: 216 MCG/G
FAT STL QL: NORMAL
NEUTRAL FAT STL QL: NORMAL

## 2023-01-07 LAB — BACTERIA STL CULT: NORMAL

## 2023-05-06 ENCOUNTER — HOSPITAL ENCOUNTER (EMERGENCY)
Facility: HOSPITAL | Age: 38
Discharge: HOME | End: 2023-05-06
Attending: FAMILY MEDICINE
Payer: COMMERCIAL

## 2023-05-06 VITALS
OXYGEN SATURATION: 100 % | WEIGHT: 149.81 LBS | TEMPERATURE: 98 F | HEART RATE: 72 BPM | BODY MASS INDEX: 24.96 KG/M2 | HEIGHT: 65 IN | DIASTOLIC BLOOD PRESSURE: 88 MMHG | RESPIRATION RATE: 18 BRPM | SYSTOLIC BLOOD PRESSURE: 142 MMHG

## 2023-05-06 DIAGNOSIS — N20.0 NEPHROLITHIASIS: Primary | ICD-10-CM

## 2023-05-06 LAB
ALBUMIN SERPL BCP-MCNC: 4.5 G/DL (ref 3.5–5.2)
ALP SERPL-CCNC: 71 U/L (ref 55–135)
ALT SERPL W/O P-5'-P-CCNC: 30 U/L (ref 10–44)
ANION GAP SERPL CALC-SCNC: 9 MMOL/L (ref 8–16)
AST SERPL-CCNC: 25 U/L (ref 10–40)
BASOPHILS # BLD AUTO: 0.02 K/UL (ref 0–0.2)
BASOPHILS NFR BLD: 0.3 % (ref 0–1.9)
BILIRUB SERPL-MCNC: 0.4 MG/DL (ref 0.1–1)
BILIRUB UR QL STRIP: NEGATIVE
BUN SERPL-MCNC: 11 MG/DL (ref 6–20)
CALCIUM SERPL-MCNC: 9.4 MG/DL (ref 8.7–10.5)
CHLORIDE SERPL-SCNC: 107 MMOL/L (ref 95–110)
CLARITY UR: CLEAR
CO2 SERPL-SCNC: 24 MMOL/L (ref 23–29)
COLOR UR: YELLOW
CREAT SERPL-MCNC: 0.9 MG/DL (ref 0.5–1.4)
DIFFERENTIAL METHOD: NORMAL
EOSINOPHIL # BLD AUTO: 0.1 K/UL (ref 0–0.5)
EOSINOPHIL NFR BLD: 1.3 % (ref 0–8)
ERYTHROCYTE [DISTWIDTH] IN BLOOD BY AUTOMATED COUNT: 12.1 % (ref 11.5–14.5)
EST. GFR  (NO RACE VARIABLE): >60 ML/MIN/1.73 M^2
GLUCOSE SERPL-MCNC: 94 MG/DL (ref 70–110)
GLUCOSE UR QL STRIP: NEGATIVE
HCT VFR BLD AUTO: 38.1 % (ref 37–48.5)
HGB BLD-MCNC: 12.5 G/DL (ref 12–16)
HGB UR QL STRIP: NEGATIVE
IMM GRANULOCYTES # BLD AUTO: 0.01 K/UL (ref 0–0.04)
IMM GRANULOCYTES NFR BLD AUTO: 0.2 % (ref 0–0.5)
KETONES UR QL STRIP: NEGATIVE
LEUKOCYTE ESTERASE UR QL STRIP: NEGATIVE
LYMPHOCYTES # BLD AUTO: 1.8 K/UL (ref 1–4.8)
LYMPHOCYTES NFR BLD: 30.3 % (ref 18–48)
MCH RBC QN AUTO: 29.8 PG (ref 27–31)
MCHC RBC AUTO-ENTMCNC: 32.8 G/DL (ref 32–36)
MCV RBC AUTO: 91 FL (ref 82–98)
MONOCYTES # BLD AUTO: 0.4 K/UL (ref 0.3–1)
MONOCYTES NFR BLD: 6.7 % (ref 4–15)
NEUTROPHILS # BLD AUTO: 3.7 K/UL (ref 1.8–7.7)
NEUTROPHILS NFR BLD: 61.2 % (ref 38–73)
NITRITE UR QL STRIP: NEGATIVE
NRBC BLD-RTO: 0 /100 WBC
PH UR STRIP: 6 [PH] (ref 5–8)
PLATELET # BLD AUTO: 211 K/UL (ref 150–450)
PMV BLD AUTO: 9.9 FL (ref 9.2–12.9)
POTASSIUM SERPL-SCNC: 3.9 MMOL/L (ref 3.5–5.1)
PROT SERPL-MCNC: 7.4 G/DL (ref 6–8.4)
PROT UR QL STRIP: NEGATIVE
RBC # BLD AUTO: 4.19 M/UL (ref 4–5.4)
SODIUM SERPL-SCNC: 140 MMOL/L (ref 136–145)
SP GR UR STRIP: <1.005 (ref 1–1.03)
URN SPEC COLLECT METH UR: ABNORMAL
UROBILINOGEN UR STRIP-ACNC: NEGATIVE EU/DL
WBC # BLD AUTO: 6 K/UL (ref 3.9–12.7)

## 2023-05-06 PROCEDURE — 85025 COMPLETE CBC W/AUTO DIFF WBC: CPT | Performed by: REGISTERED NURSE

## 2023-05-06 PROCEDURE — 63600175 PHARM REV CODE 636 W HCPCS: Performed by: REGISTERED NURSE

## 2023-05-06 PROCEDURE — 96365 THER/PROPH/DIAG IV INF INIT: CPT

## 2023-05-06 PROCEDURE — 25000003 PHARM REV CODE 250: Performed by: REGISTERED NURSE

## 2023-05-06 PROCEDURE — 25000003 PHARM REV CODE 250: Performed by: FAMILY MEDICINE

## 2023-05-06 PROCEDURE — 81003 URINALYSIS AUTO W/O SCOPE: CPT | Performed by: REGISTERED NURSE

## 2023-05-06 PROCEDURE — 99285 EMERGENCY DEPT VISIT HI MDM: CPT | Mod: 25

## 2023-05-06 PROCEDURE — 80053 COMPREHEN METABOLIC PANEL: CPT | Performed by: REGISTERED NURSE

## 2023-05-06 PROCEDURE — 96375 TX/PRO/DX INJ NEW DRUG ADDON: CPT

## 2023-05-06 RX ORDER — TIZANIDINE 4 MG/1
4 TABLET ORAL EVERY 8 HOURS PRN
COMMUNITY
Start: 2023-04-20

## 2023-05-06 RX ORDER — HYDROCODONE BITARTRATE AND ACETAMINOPHEN 10; 325 MG/1; MG/1
1 TABLET ORAL
Status: COMPLETED | OUTPATIENT
Start: 2023-05-06 | End: 2023-05-06

## 2023-05-06 RX ORDER — KETOROLAC TROMETHAMINE 10 MG/1
10 TABLET, FILM COATED ORAL EVERY 6 HOURS
Qty: 20 TABLET | Refills: 0 | Status: SHIPPED | OUTPATIENT
Start: 2023-05-06 | End: 2023-05-11

## 2023-05-06 RX ORDER — CLONAZEPAM 0.25 MG/1
0.25 TABLET, ORALLY DISINTEGRATING ORAL NIGHTLY
COMMUNITY
Start: 2023-04-21

## 2023-05-06 RX ORDER — RIZATRIPTAN BENZOATE 10 MG/1
10 TABLET, ORALLY DISINTEGRATING ORAL
COMMUNITY
Start: 2023-04-24

## 2023-05-06 RX ORDER — TAMSULOSIN HYDROCHLORIDE 0.4 MG/1
0.4 CAPSULE ORAL DAILY
Qty: 10 CAPSULE | Refills: 0 | Status: SHIPPED | OUTPATIENT
Start: 2023-05-06 | End: 2024-05-05

## 2023-05-06 RX ORDER — HYDROCODONE BITARTRATE AND ACETAMINOPHEN 10; 325 MG/1; MG/1
1 TABLET ORAL EVERY 6 HOURS PRN
COMMUNITY
Start: 2023-04-20

## 2023-05-06 RX ORDER — PROMETHAZINE HYDROCHLORIDE 25 MG/1
25 TABLET ORAL EVERY 6 HOURS PRN
Qty: 15 TABLET | Refills: 0 | Status: SHIPPED | OUTPATIENT
Start: 2023-05-06

## 2023-05-06 RX ORDER — KETOROLAC TROMETHAMINE 30 MG/ML
30 INJECTION, SOLUTION INTRAMUSCULAR; INTRAVENOUS
Status: COMPLETED | OUTPATIENT
Start: 2023-05-06 | End: 2023-05-06

## 2023-05-06 RX ADMIN — PROMETHAZINE HYDROCHLORIDE 12.5 MG: 25 INJECTION INTRAMUSCULAR; INTRAVENOUS at 06:05

## 2023-05-06 RX ADMIN — HYDROCODONE BITARTRATE AND ACETAMINOPHEN 1 TABLET: 10; 325 TABLET ORAL at 07:05

## 2023-05-06 RX ADMIN — KETOROLAC TROMETHAMINE 30 MG: 30 INJECTION, SOLUTION INTRAMUSCULAR; INTRAVENOUS at 06:05

## 2023-05-06 RX ADMIN — SODIUM CHLORIDE 1000 ML: 9 INJECTION, SOLUTION INTRAVENOUS at 06:05

## 2023-05-06 NOTE — FIRST PROVIDER EVALUATION
Medical screening examination initiated.  I have conducted a focused provider triage encounter, findings are as follows:    Brief history of present illness:  Left flank pain and nausea, history kidney stones    Vitals:    05/06/23 1604   BP: 93/60   BP Location: Right arm   Patient Position: Sitting   Pulse: 109   Resp: 20   Temp: 97.9 °F (36.6 °C)   TempSrc: Oral   SpO2: 100%   Weight: 67.9 kg (149 lb 12.8 oz)       Pertinent physical exam:  No acute distress, vital signs stable, patient alert and oriented    Brief workup plan:  Workup    Preliminary workup initiated; this workup will be continued and followed by the physician or advanced practice provider that is assigned to the patient when roomed.

## 2023-05-06 NOTE — ED PROVIDER NOTES
SCRIBE #1 NOTE: I, Me-Morgan Cardenas, am scribing for, and in the presence of, Halle Perez MD. I have scribed the entire note.       History     Chief Complaint   Patient presents with    Flank Pain     Left flank pain x 2 days ago, now reports difficult to urinate, recently dx with UTI and finished antibiotics.     Review of patient's allergies indicates:   Allergen Reactions    Dioxyline phosphate Nausea And Vomiting    Decadron [dexamethasone sodium phosphate]     Doxycycline     Morphine      Migraine      Zofran [ondansetron hcl (pf)] Hives         History of Present Illness     HPI    5/6/2023, 4:51 PM  History obtained from the patient      History of Present Illness: Jimena Escalante is a 37 y.o. female patient with a PMHx of DJD, fibromyalgia, gastroparesis, GERD, meningitis, osteoarthritis, and ovarian cyst who presents to the Emergency Department for evaluation of L flank pain which onset 2 days ago. Pt was recently diagnosed with UTI and has finished prescribed course of abx. Pt reports that she has had kidney stones in the past. Symptoms are constant and moderate in severity. No mitigating or exacerbating factors reported. Associated sxs include difficulty urinating and vomiting. Patient denies any fever, chills, CP, SOB, weakness, and all other sxs at this time. No prior Tx reported. No further complaints or concerns at this time.       Arrival mode: Personal vehicle      PCP: Mathew Keys MD        Past Medical History:  Past Medical History:   Diagnosis Date    ADD (attention deficit disorder)     Anxiety     Degenerative disc disease     Depression     DJD (degenerative joint disease)     Fibromyalgia     Gastroparesis     GERD (gastroesophageal reflux disease)     H/O Clostridium difficile infection     History of infection of intestine due to Clostridium difficile     approximately 2018    IBS (irritable bowel syndrome)     Meningitis     Migraine headache     Osteoarthritis      Ovarian cyst     Tachycardia     West Nile fever meningitis        Past Surgical History:  Past Surgical History:   Procedure Laterality Date    CARDIAC CATHETERIZATION      EXTRACORPOREAL SHOCK WAVE LITHOTRIPSY      HYSTERECTOMY      KNEE ARTHROSCOPY      TUBAL LIGATION           Family History:  Family History   Problem Relation Age of Onset    Hypertension Mother     Hypertension Father     Heart disease Father     Diabetes Maternal Aunt     Diabetes Maternal Grandmother        Social History:  Social History     Tobacco Use    Smoking status: Every Day     Packs/day: 0.50     Types: Cigarettes    Smokeless tobacco: Never   Substance and Sexual Activity    Alcohol use: Yes     Comment: occasionally    Drug use: No    Sexual activity: Yes     Partners: Male        Review of Systems     Review of Systems   Constitutional:  Negative for chills and fever.   HENT:  Negative for sore throat.    Respiratory:  Negative for shortness of breath.    Cardiovascular:  Negative for chest pain.   Gastrointestinal:  Positive for vomiting. Negative for diarrhea and nausea.   Genitourinary:  Positive for difficulty urinating and flank pain (L). Negative for dysuria.   Musculoskeletal:  Negative for back pain.   Skin:  Negative for rash.   Neurological:  Negative for weakness.   Hematological:  Does not bruise/bleed easily.   All other systems reviewed and are negative.     Physical Exam     Initial Vitals [05/06/23 1604]   BP Pulse Resp Temp SpO2   93/60 109 20 97.9 °F (36.6 °C) 100 %      MAP       --          Physical Exam  Nursing Notes and Vital Signs Reviewed.  Constitutional: Patient is in no acute distress. Well-developed and well-nourished.  Head: Atraumatic. Normocephalic.  Eyes: PERRL. EOM intact. Conjunctivae are not pale. No scleral icterus.  ENT: Mucous membranes are moist. Oropharynx is clear and symmetric.    Neck: Supple. Full ROM. No lymphadenopathy.  Cardiovascular: Regular rate. Regular rhythm. No murmurs, rubs,  "or gallops. Distal pulses are 2+ and symmetric.  Pulmonary/Chest: No respiratory distress. Clear to auscultation bilaterally. No wheezing or rales.  Abdominal: Soft and non-distended.  There is no tenderness.  No rebound, guarding, or rigidity. Good bowel sounds.  Genitourinary: No CVA tenderness. L flank tenderness.   Musculoskeletal: Moves all extremities. No obvious deformities. No edema. No calf tenderness.  Skin: Warm and dry.  Neurological:  Alert, awake, and appropriate.  Normal speech.  No acute focal neurological deficits are appreciated.  Psychiatric: Normal affect. Good eye contact. Appropriate in content.     ED Course   Procedures  ED Vital Signs:  Vitals:    05/06/23 1604 05/06/23 1702 05/06/23 1734 05/06/23 1833   BP: 93/60 103/70  138/64   Pulse: 109 72  75   Resp: 20      Temp: 97.9 °F (36.6 °C)      TempSrc: Oral      SpO2: 100% 99%  100%   Weight: 67.9 kg (149 lb 12.8 oz)      Height:   5' 5" (1.651 m)     05/06/23 1856 05/06/23 1900 05/06/23 1924 05/06/23 2015   BP: 139/80 139/80  (!) 142/88   Pulse: 68 68  72   Resp: 18 18 18 18   Temp: 98 °F (36.7 °C) 98 °F (36.7 °C)  98 °F (36.7 °C)   TempSrc: Oral Oral     SpO2:  100%     Weight:       Height:           Abnormal Lab Results:  Labs Reviewed   URINALYSIS, REFLEX TO URINE CULTURE - Abnormal; Notable for the following components:       Result Value    Specific Gravity, UA <1.005 (*)     All other components within normal limits    Narrative:     Specimen Source->Urine   CBC W/ AUTO DIFFERENTIAL   COMPREHENSIVE METABOLIC PANEL        All Lab Results:  Results for orders placed or performed during the hospital encounter of 05/06/23   CBC auto differential   Result Value Ref Range    WBC 6.00 3.90 - 12.70 K/uL    RBC 4.19 4.00 - 5.40 M/uL    Hemoglobin 12.5 12.0 - 16.0 g/dL    Hematocrit 38.1 37.0 - 48.5 %    MCV 91 82 - 98 fL    MCH 29.8 27.0 - 31.0 pg    MCHC 32.8 32.0 - 36.0 g/dL    RDW 12.1 11.5 - 14.5 %    Platelets 211 150 - 450 K/uL    MPV " 9.9 9.2 - 12.9 fL    Immature Granulocytes 0.2 0.0 - 0.5 %    Gran # (ANC) 3.7 1.8 - 7.7 K/uL    Immature Grans (Abs) 0.01 0.00 - 0.04 K/uL    Lymph # 1.8 1.0 - 4.8 K/uL    Mono # 0.4 0.3 - 1.0 K/uL    Eos # 0.1 0.0 - 0.5 K/uL    Baso # 0.02 0.00 - 0.20 K/uL    nRBC 0 0 /100 WBC    Gran % 61.2 38.0 - 73.0 %    Lymph % 30.3 18.0 - 48.0 %    Mono % 6.7 4.0 - 15.0 %    Eosinophil % 1.3 0.0 - 8.0 %    Basophil % 0.3 0.0 - 1.9 %    Differential Method Automated    Comprehensive metabolic panel   Result Value Ref Range    Sodium 140 136 - 145 mmol/L    Potassium 3.9 3.5 - 5.1 mmol/L    Chloride 107 95 - 110 mmol/L    CO2 24 23 - 29 mmol/L    Glucose 94 70 - 110 mg/dL    BUN 11 6 - 20 mg/dL    Creatinine 0.9 0.5 - 1.4 mg/dL    Calcium 9.4 8.7 - 10.5 mg/dL    Total Protein 7.4 6.0 - 8.4 g/dL    Albumin 4.5 3.5 - 5.2 g/dL    Total Bilirubin 0.4 0.1 - 1.0 mg/dL    Alkaline Phosphatase 71 55 - 135 U/L    AST 25 10 - 40 U/L    ALT 30 10 - 44 U/L    Anion Gap 9 8 - 16 mmol/L    eGFR >60 >60 mL/min/1.73 m^2   Urinalysis, Reflex to Urine Culture Urine, Clean Catch    Specimen: Urine   Result Value Ref Range    Specimen UA Urine, Clean Catch     Color, UA Yellow Yellow, Straw, Kelsie    Appearance, UA Clear Clear    pH, UA 6.0 5.0 - 8.0    Specific Gravity, UA <1.005 (A) 1.005 - 1.030    Protein, UA Negative Negative    Glucose, UA Negative Negative    Ketones, UA Negative Negative    Bilirubin (UA) Negative Negative    Occult Blood UA Negative Negative    Nitrite, UA Negative Negative    Urobilinogen, UA Negative <2.0 EU/dL    Leukocytes, UA Negative Negative         Imaging Results:  Imaging Results              CT Renal Stone Study ABD Pelvis WO (Final result)  Result time 05/06/23 16:36:47      Final result by Deneen Johnson MD (05/06/23 16:36:47)                   Impression:      Punctate urolith at the left UVJ with proximal hydronephrosis      Electronically signed by: Deneen Moses  Alex  Date:    05/06/2023  Time:    16:36               Narrative:    EXAMINATION:  CT RENAL STONE STUDY ABD PELVIS WO    CLINICAL HISTORY:  Flank pain, kidney stone suspected;    TECHNIQUE:  Low dose axial images, sagittal and coronal reformations were obtained from the lung bases to the pubic symphysis.  Contrast was not administered.    COMPARISON:  January 2023    FINDINGS:  Iterative technique for diminishing radiation exposure automated    There is a punctate urolith at the left UVJ with proximal hydroureteronephrosis.  No right hydronephrosis    Kidney and spleen stable size.  Gallbladder absent    Pancreas stable    Appendix normal caliber.  Distal small bowel contains fluid minimally distended is    Urinary bladder decompressed    Small ventral fat containing hernia stable supraumbilical                                              The Emergency Provider reviewed the vital signs and test results, which are outlined above.     ED Discussion     7:53 PM: Reassessed pt at this time. Discussed with pt all pertinent ED information and results. Discussed pt dx and plan of tx. Gave pt all f/u and return to the ED instructions. All questions and concerns were addressed at this time. Pt expresses understanding of information and instructions, and is comfortable with plan to discharge. Pt is stable for discharge.    I discussed with patient and/or family/caretaker that evaluation in the ED does not suggest any emergent or life threatening medical conditions requiring immediate intervention beyond what was provided in the ED, and I believe patient is safe for discharge.  Regardless, an unremarkable evaluation in the ED does not preclude the development or presence of a serious of life threatening condition. As such, patient was instructed to return immediately for any worsening or change in current symptoms.         Medical Decision Making:   Clinical Tests:   Lab Tests: Ordered and Reviewed  Radiological Study:  Ordered and Reviewed         ED Medication(s):  Medications   ketorolac injection 30 mg (30 mg Intravenous Given 5/6/23 1802)   promethazine (PHENERGAN) 12.5 mg in dextrose 5 % (D5W) 50 mL IVPB (0 mg Intravenous Stopped 5/6/23 1823)   sodium chloride 0.9% bolus 1,000 mL 1,000 mL (0 mLs Intravenous Stopped 5/6/23 1928)   HYDROcodone-acetaminophen  mg per tablet 1 tablet (1 tablet Oral Given 5/6/23 1924)       Discharge Medication List as of 5/6/2023  7:51 PM           Follow-up Information       Mathew Keys MD. Schedule an appointment as soon as possible for a visit in 1 week.    Specialty: General Practice  Contact information:  [address] [facility] [address] [phone]                                 Scribe Attestation:   Scribe #1: I performed the above scribed service and the documentation accurately describes the services I performed. I attest to the accuracy of the note.     Attending:   Physician Attestation Statement for Scribe #1: I, Halle Perez MD, personally performed the services described in this documentation, as scribed by Dawn Cardenas, in my presence, and it is both accurate and complete.           Clinical Impression       ICD-10-CM ICD-9-CM   1. Nephrolithiasis  N20.0 592.0       Disposition:   Disposition: Discharged  Condition: Stable       Halle Perez MD  05/07/23 9036